# Patient Record
Sex: MALE | Employment: FULL TIME | URBAN - METROPOLITAN AREA
[De-identification: names, ages, dates, MRNs, and addresses within clinical notes are randomized per-mention and may not be internally consistent; named-entity substitution may affect disease eponyms.]

---

## 2021-08-04 ENCOUNTER — THERAPY VISIT (OUTPATIENT)
Dept: PHYSICAL THERAPY | Facility: CLINIC | Age: 46
End: 2021-08-04
Payer: COMMERCIAL

## 2021-08-04 DIAGNOSIS — G89.29 CHRONIC RIGHT SHOULDER PAIN: ICD-10-CM

## 2021-08-04 DIAGNOSIS — M25.511 CHRONIC RIGHT SHOULDER PAIN: ICD-10-CM

## 2021-08-04 PROCEDURE — 97161 PT EVAL LOW COMPLEX 20 MIN: CPT | Mod: GP | Performed by: PHYSICAL THERAPIST

## 2021-08-04 PROCEDURE — 97110 THERAPEUTIC EXERCISES: CPT | Mod: GP | Performed by: PHYSICAL THERAPIST

## 2021-08-04 NOTE — PROGRESS NOTES
Physical Therapy Initial Evaluation  Subjective:  The history is provided by the patient. No  was used.   Patient Health History  Guido Little being seen for R shoulder.     Date of Onset: chronic with injection 7/24/21.   Problem occurred: insidious   Pain is reported as 4/10 on pain scale.  General health as reported by patient is excellent.  Pertinent medical history includes: none.   Red flags:  None as reported by patient.  Medical allergies: none.   Surgeries include:  None.    Current medications:  None.       Primary job tasks include:  Computer work.                  Therapist Generated HPI Evaluation  Problem details: Chronic R shoulder pain with 2 injections this year. 7/24/21 last injection which has helped. X-rays show Type 3 acromion. Had decompression surgery L shoulder  In the past.         Type of problem:  Right shoulder.    This is a chronic condition.  Condition occurred with:  Unknown cause.  Where condition occurred: for unknown reasons.  Patient reports pain:  Anterior and lateral.  Pain is described as aching and is intermittent.  Pain radiates to:  Upper arm. Pain is worse during the day.  Since onset symptoms are rapidly improving.  Associated symptoms:  Loss of motion/stiffness. Symptoms are exacerbated by lying on extremity, lifting and using arm overhead  and relieved by rest.  Special tests included:  X-ray.    Restrictions due to condition include:  Working in normal job without restrictions.  Barriers include:  None as reported by patient.                        Objective:  Standing Alignment:      Shoulder/UE:  Normal                                       Shoulder Evaluation:  ROM:  AROM:  normal                            PROM:  normal                                Strength:  : empty can, abd 4+/5 and Er 5-/5 R.                      Stability Testing:  normal      Special Tests:      Right shoulder positive for the following special  tests:Impingement  Right shoulder negative for the following special tests:Bursal; Rotator cuff tear and Acrimioclavicular  Palpation:  not assessed      Mobility Tests:  normal                                                 General     ROS    Assessment/Plan:    Patient is a 45 year old male with right side shoulder complaints.    Patient has the following significant findings with corresponding treatment plan.                Diagnosis 1:  R shoulder impingement  Pain -  self management, education and home program  Decreased function - therapeutic activities    Therapy Evaluation Codes:   1) History comprised of:   Personal factors that impact the plan of care:      None.    Comorbidity factors that impact the plan of care are:      None.     Medications impacting care: None.  2) Examination of Body Systems comprised of:   Body structures and functions that impact the plan of care:      Shoulder.   Activity limitations that impact the plan of care are:      Lifting, Sports and Throwing.  3) Clinical presentation characteristics are:   Stable/Uncomplicated.  4) Decision-Making    Low complexity using standardized patient assessment instrument and/or measureable assessment of functional outcome.  Cumulative Therapy Evaluation is: Low complexity.    Previous and current functional limitations:  (See Goal Flow Sheet for this information)    Short term and Long term goals: (See Goal Flow Sheet for this information)     Communication ability:  Patient appears to be able to clearly communicate and understand verbal and written communication and follow directions correctly.  Treatment Explanation - The following has been discussed with the patient:   RX ordered/plan of care  Anticipated outcomes  Possible risks and side effects  This patient would benefit from PT intervention to resume normal activities.   Rehab potential is excellent.    Frequency:  2 X a month, once daily  Duration:  for 3 months  Discharge Plan:   Achieve all LTG.  Independent in home treatment program.  Reach maximal therapeutic benefit.    Please refer to the daily flowsheet for treatment today, total treatment time and time spent performing 1:1 timed codes.

## 2021-08-25 ENCOUNTER — THERAPY VISIT (OUTPATIENT)
Dept: PHYSICAL THERAPY | Facility: CLINIC | Age: 46
End: 2021-08-25
Payer: COMMERCIAL

## 2021-08-25 DIAGNOSIS — G89.29 CHRONIC RIGHT SHOULDER PAIN: ICD-10-CM

## 2021-08-25 DIAGNOSIS — M25.511 CHRONIC RIGHT SHOULDER PAIN: ICD-10-CM

## 2021-08-25 PROCEDURE — 97110 THERAPEUTIC EXERCISES: CPT | Mod: GP | Performed by: PHYSICAL THERAPIST

## 2021-10-06 NOTE — PROGRESS NOTES
DISCHARGE SUMMARY    Guido Little was seen 2 times for evaluation and treatment.  Patient did not return for further treatment and current status is unknown.  Due to short treatment duration, no objective or functional changes were made.  Please see goal flow sheet from episode noted date below and initial evaluation for further information.  Patient is discharged from therapy and therapy episode is resolved as of 10/06/21.      Linked Episodes   Type: Episode: Status: Noted: Resolved: Last update: Updated by:   PHYSICAL THERAPY R shoulder [id number] Active 8/4/2021 8/25/2021  9:10 AM Ashleigh Pt, SWEETIE Estrada      Comments:

## 2021-12-30 ENCOUNTER — THERAPY VISIT (OUTPATIENT)
Dept: PHYSICAL THERAPY | Facility: CLINIC | Age: 46
End: 2021-12-30
Payer: COMMERCIAL

## 2021-12-30 DIAGNOSIS — G89.29 CHRONIC RIGHT SHOULDER PAIN: Primary | ICD-10-CM

## 2021-12-30 DIAGNOSIS — M25.511 CHRONIC RIGHT SHOULDER PAIN: Primary | ICD-10-CM

## 2021-12-30 DIAGNOSIS — Z47.89 AFTERCARE FOLLOWING SURGERY OF THE MUSCULOSKELETAL SYSTEM: ICD-10-CM

## 2021-12-30 PROCEDURE — 97161 PT EVAL LOW COMPLEX 20 MIN: CPT | Mod: GP | Performed by: PHYSICAL THERAPIST

## 2021-12-30 PROCEDURE — 97112 NEUROMUSCULAR REEDUCATION: CPT | Mod: GP | Performed by: PHYSICAL THERAPIST

## 2021-12-30 PROCEDURE — 97110 THERAPEUTIC EXERCISES: CPT | Mod: GP | Performed by: PHYSICAL THERAPIST

## 2021-12-30 NOTE — PROGRESS NOTES
[facility] Initial Evaluation     Present: no    Subjective:  Guido Little is a 46 year old male with a right shoulder condition. Presents to PT s/p Right shoulder decompression surgery by Dr. Costello on 12/20/21 after 6 months of shoulder pain. Pt reports that his shoulder is improving overall since the surgery. Chief complaint is pain with reaching, lifting, and quick motions. Denies vague symptoms. Wants to be active, lift weights, run and keep up with sons pain free.      Symptoms commenced as a result of: chronic/surgery. Condition occurred in the following environment: overtime. Onset of symptoms: 12/20/21 Date of surgery. Location of symptoms: anterior right shoulder. Pain level on number scale: 1/10. Quality of pain: dull. Associated symptoms: none. Pain frequency (constant/intermittent): intermittent. Symptoms are exacerbated by: reaching, lifting, quick motions, overuse. Symptoms are relieved by: rest, avoidance. Progression of symptoms since onset (same/better/worse): better. Special tests (x-ray, MRI, CT scan, EMG, bone scan): see EPIC. Previous treatment: PT/surgery. Improvement with previous treatment: yes. General health as reported by patient is good. Pertinent medical history includes: See Epic. Medical allergies: see Epic. Other pertinent surgeries: see Epic. Current medications: See Epic. Occupation: Finance. Patient is (working in normal job without restrictions/working in normal job with restrictions/working in an alternate job/not working due to present treatment problem): normal. Primary job tasks: sitting, computer work. Barriers at home/work: None reported by patient. Red flags: None reported by patient.    Objective  Posture: forward head rounded shoulders    Screening: Negative    Flexibility: limited assessment post op    Shoulder AROM (* = pain) Right Left           180   * 180   ER 65 70   IR T10 T5     Shoulder PROM (* = pain) Right  Left    180    180   ER In 90 abduction 70 In 90 abduction 90   IR In 90 abduction 55 In 90 abduction 70     Shoulder Strength (* = pain) Right Left   FL 4+/5 5/5   ABD 4+/5 5/5   ER (0 abduction) 5-/5 5/5   ER (90 abduction) NT/5 NT/5   IR 5/5 5/5   Biceps 5/5 5/5   Middle Trapezius NT/5 NT/5   Lower Trapezius NT/5 NT/5     Palpation tenderness: unremarkable    Accessory Motion: GH L normal, GH R limited assessment    Other Tests: none    Key Findings  -decreased AROM/PROM right shoulder post op  -mild scap/cuff weakness right shoulder  Assessment/Plan:    Patient is a 46 year old male with right side shoulder complaints.    Patient has the following significant findings with corresponding treatment plan.                Diagnosis 1:  Chronic right shoulder pain, aftercare s/p right shoulder decompression  Pain -  manual therapy, self management, education and home program  Decreased ROM/flexibility - manual therapy and therapeutic exercise  Decreased joint mobility - manual therapy and therapeutic exercise  Decreased strength - therapeutic exercise and therapeutic activities  Impaired muscle performance - neuro re-education  Decreased function - therapeutic activities  Impaired posture - neuro re-education    Therapy Evaluation Codes:   1) History comprised of:   Personal factors that impact the plan of care:      Time since onset of symptoms.    Comorbidity factors that impact the plan of care are:      None.     Medications impacting care: None.  2) Examination of Body Systems comprised of:   Body structures and functions that impact the plan of care:      Shoulder.   Activity limitations that impact the plan of care are:      Cooking, Driving, Dressing, Grasping, Lifting, Sports, Sleeping and Laying down.  3) Clinical presentation characteristics are:   Stable/Uncomplicated.  4) Decision-Making    Low complexity using standardized patient assessment instrument and/or measureable assessment of  functional outcome.  Cumulative Therapy Evaluation is: Low complexity.    Previous and current functional limitations:  (See Goal Flow Sheet for this information)    Short term and Long term goals: (See Goal Flow Sheet for this information)     Communication ability:  Patient appears to be able to clearly communicate and understand verbal and written communication and follow directions correctly.  Treatment Explanation - The following has been discussed with the patient:   RX ordered/plan of care  Anticipated outcomes  Possible risks and side effects  This patient would benefit from PT intervention to resume normal activities.   Rehab potential is good.    Frequency:  1 X week, once daily  Duration:  for 10 weeks  Discharge Plan:  Achieve all LTG.  Independent in home treatment program.  Reach maximal therapeutic benefit.    Please refer to the daily flowsheet for treatment today, total treatment time and time spent performing 1:1 timed codes.     Please Contact me with any questions or concerns. Thank you for for patience and cooperation.     Jan Juárez PT, DPT, [facility]  Physical Therapist  [facility] [place]  [phone]

## 2022-01-05 ENCOUNTER — VIRTUAL VISIT (OUTPATIENT)
Dept: PHYSICAL THERAPY | Facility: CLINIC | Age: 47
End: 2022-01-05
Payer: COMMERCIAL

## 2022-01-05 DIAGNOSIS — Z47.89 AFTERCARE FOLLOWING SURGERY OF THE MUSCULOSKELETAL SYSTEM: ICD-10-CM

## 2022-01-05 PROCEDURE — 97110 THERAPEUTIC EXERCISES: CPT | Mod: GP | Performed by: PHYSICAL THERAPIST

## 2022-02-09 PROBLEM — Z47.89 AFTERCARE FOLLOWING SURGERY OF THE MUSCULOSKELETAL SYSTEM: Status: RESOLVED | Noted: 2021-12-30 | Resolved: 2022-02-09

## 2022-02-09 NOTE — PROGRESS NOTES
DISCHARGE SUMMARY    Guido Little was seen 2 times for evaluation and treatment.  Patient did not return for further treatment and current status is unknown.  Due to short treatment duration, no objective or functional changes were made.  Please see goal flow sheet from episode noted date below and initial evaluation for further information.  Patient is discharged from therapy and therapy episode is resolved as of 02/09/22.      Linked Episodes   Type: Episode: Status: Noted: Resolved: Last update: Updated by:   PHYSICAL THERAPY R shoulder, post op (12/20/21) 12/30/21 Active 12/30/2021 1/5/2022  7:36 AM Jan Juárez PT      Comments:       Please Contact me with any questions or concerns. Thank you for for patience and cooperation.     Jan Juárez PT, DPT, [facility]  Physical Therapist  [facility] [place]  [phone]